# Patient Record
Sex: MALE | Race: WHITE | NOT HISPANIC OR LATINO | URBAN - METROPOLITAN AREA
[De-identification: names, ages, dates, MRNs, and addresses within clinical notes are randomized per-mention and may not be internally consistent; named-entity substitution may affect disease eponyms.]

---

## 2017-04-17 ENCOUNTER — EMERGENCY (EMERGENCY)
Facility: HOSPITAL | Age: 57
LOS: 0 days | Discharge: LEFT BEFORE DISCHARGE | End: 2017-04-17
Admitting: FAMILY MEDICINE

## 2017-06-27 DIAGNOSIS — E78.00 PURE HYPERCHOLESTEROLEMIA, UNSPECIFIED: ICD-10-CM

## 2017-06-27 DIAGNOSIS — Z79.02 LONG TERM (CURRENT) USE OF ANTITHROMBOTICS/ANTIPLATELETS: ICD-10-CM

## 2017-06-27 DIAGNOSIS — R05 COUGH: ICD-10-CM

## 2017-06-27 DIAGNOSIS — K21.9 GASTRO-ESOPHAGEAL REFLUX DISEASE WITHOUT ESOPHAGITIS: ICD-10-CM

## 2017-06-27 DIAGNOSIS — Z87.891 PERSONAL HISTORY OF NICOTINE DEPENDENCE: ICD-10-CM

## 2017-06-27 DIAGNOSIS — I10 ESSENTIAL (PRIMARY) HYPERTENSION: ICD-10-CM

## 2017-06-27 DIAGNOSIS — R07.89 OTHER CHEST PAIN: ICD-10-CM

## 2017-06-27 DIAGNOSIS — Z79.899 OTHER LONG TERM (CURRENT) DRUG THERAPY: ICD-10-CM

## 2017-06-27 DIAGNOSIS — R06.02 SHORTNESS OF BREATH: ICD-10-CM

## 2017-06-27 DIAGNOSIS — Z79.82 LONG TERM (CURRENT) USE OF ASPIRIN: ICD-10-CM

## 2017-06-27 DIAGNOSIS — I25.2 OLD MYOCARDIAL INFARCTION: ICD-10-CM

## 2017-06-27 DIAGNOSIS — Z95.5 PRESENCE OF CORONARY ANGIOPLASTY IMPLANT AND GRAFT: ICD-10-CM

## 2017-07-18 ENCOUNTER — OUTPATIENT (OUTPATIENT)
Dept: OUTPATIENT SERVICES | Facility: HOSPITAL | Age: 57
LOS: 1 days | Discharge: HOME | End: 2017-07-18

## 2017-07-18 DIAGNOSIS — I25.10 ATHEROSCLEROTIC HEART DISEASE OF NATIVE CORONARY ARTERY WITHOUT ANGINA PECTORIS: ICD-10-CM

## 2017-07-18 DIAGNOSIS — R07.9 CHEST PAIN, UNSPECIFIED: ICD-10-CM

## 2017-07-18 DIAGNOSIS — N40.0 BENIGN PROSTATIC HYPERPLASIA WITHOUT LOWER URINARY TRACT SYMPTOMS: ICD-10-CM

## 2017-07-18 DIAGNOSIS — J18.9 PNEUMONIA, UNSPECIFIED ORGANISM: ICD-10-CM

## 2017-07-18 DIAGNOSIS — I10 ESSENTIAL (PRIMARY) HYPERTENSION: ICD-10-CM

## 2017-07-18 DIAGNOSIS — R78.81 BACTEREMIA: ICD-10-CM

## 2017-07-18 DIAGNOSIS — K29.70 GASTRITIS, UNSPECIFIED, WITHOUT BLEEDING: ICD-10-CM

## 2017-07-18 DIAGNOSIS — F93.0 SEPARATION ANXIETY DISORDER OF CHILDHOOD: ICD-10-CM

## 2017-07-18 DIAGNOSIS — Z02.83 ENCOUNTER FOR BLOOD-ALCOHOL AND BLOOD-DRUG TEST: ICD-10-CM

## 2017-07-24 ENCOUNTER — OUTPATIENT (OUTPATIENT)
Dept: OUTPATIENT SERVICES | Facility: HOSPITAL | Age: 57
LOS: 1 days | Discharge: HOME | End: 2017-07-24

## 2017-07-24 DIAGNOSIS — I25.10 ATHEROSCLEROTIC HEART DISEASE OF NATIVE CORONARY ARTERY WITHOUT ANGINA PECTORIS: ICD-10-CM

## 2017-07-24 DIAGNOSIS — R52 PAIN, UNSPECIFIED: ICD-10-CM

## 2017-07-24 DIAGNOSIS — R07.9 CHEST PAIN, UNSPECIFIED: ICD-10-CM

## 2017-07-24 DIAGNOSIS — F93.0 SEPARATION ANXIETY DISORDER OF CHILDHOOD: ICD-10-CM

## 2017-07-24 DIAGNOSIS — I10 ESSENTIAL (PRIMARY) HYPERTENSION: ICD-10-CM

## 2017-07-24 DIAGNOSIS — K29.70 GASTRITIS, UNSPECIFIED, WITHOUT BLEEDING: ICD-10-CM

## 2017-07-24 DIAGNOSIS — N40.0 BENIGN PROSTATIC HYPERPLASIA WITHOUT LOWER URINARY TRACT SYMPTOMS: ICD-10-CM

## 2017-07-24 DIAGNOSIS — J18.9 PNEUMONIA, UNSPECIFIED ORGANISM: ICD-10-CM

## 2017-07-24 DIAGNOSIS — R78.81 BACTEREMIA: ICD-10-CM

## 2017-09-20 PROBLEM — Z00.00 ENCOUNTER FOR PREVENTIVE HEALTH EXAMINATION: Status: ACTIVE | Noted: 2017-09-20

## 2017-10-20 ENCOUNTER — APPOINTMENT (OUTPATIENT)
Dept: VASCULAR SURGERY | Facility: CLINIC | Age: 57
End: 2017-10-20
Payer: MEDICAID

## 2017-10-20 VITALS
WEIGHT: 176 LBS | DIASTOLIC BLOOD PRESSURE: 68 MMHG | SYSTOLIC BLOOD PRESSURE: 126 MMHG | HEIGHT: 71 IN | BODY MASS INDEX: 24.64 KG/M2

## 2017-10-20 DIAGNOSIS — Z98.61 CORONARY ANGIOPLASTY STATUS: ICD-10-CM

## 2017-10-20 DIAGNOSIS — Z87.09 PERSONAL HISTORY OF OTHER DISEASES OF THE RESPIRATORY SYSTEM: ICD-10-CM

## 2017-10-20 DIAGNOSIS — Z87.891 PERSONAL HISTORY OF NICOTINE DEPENDENCE: ICD-10-CM

## 2017-10-20 DIAGNOSIS — Z86.59 PERSONAL HISTORY OF OTHER MENTAL AND BEHAVIORAL DISORDERS: ICD-10-CM

## 2017-10-20 DIAGNOSIS — I71.2 THORACIC AORTIC ANEURYSM, W/OUT RUPTURE: ICD-10-CM

## 2017-10-20 DIAGNOSIS — I25.2 OLD MYOCARDIAL INFARCTION: ICD-10-CM

## 2017-10-20 DIAGNOSIS — Z78.9 OTHER SPECIFIED HEALTH STATUS: ICD-10-CM

## 2017-10-20 DIAGNOSIS — Z86.39 PERSONAL HISTORY OF OTHER ENDOCRINE, NUTRITIONAL AND METABOLIC DISEASE: ICD-10-CM

## 2017-10-20 DIAGNOSIS — Z87.19 PERSONAL HISTORY OF OTHER DISEASES OF THE DIGESTIVE SYSTEM: ICD-10-CM

## 2017-10-20 DIAGNOSIS — F41.9 ANXIETY DISORDER, UNSPECIFIED: ICD-10-CM

## 2017-10-20 PROCEDURE — 93978 VASCULAR STUDY: CPT

## 2017-10-20 PROCEDURE — 93925 LOWER EXTREMITY STUDY: CPT

## 2017-10-20 PROCEDURE — 99203 OFFICE O/P NEW LOW 30 MIN: CPT

## 2017-11-22 ENCOUNTER — OUTPATIENT (OUTPATIENT)
Dept: OUTPATIENT SERVICES | Facility: HOSPITAL | Age: 57
LOS: 1 days | Discharge: HOME | End: 2017-11-22

## 2017-11-22 DIAGNOSIS — N40.0 BENIGN PROSTATIC HYPERPLASIA WITHOUT LOWER URINARY TRACT SYMPTOMS: ICD-10-CM

## 2017-11-22 DIAGNOSIS — R07.9 CHEST PAIN, UNSPECIFIED: ICD-10-CM

## 2017-11-22 DIAGNOSIS — R78.81 BACTEREMIA: ICD-10-CM

## 2017-11-22 DIAGNOSIS — J18.9 PNEUMONIA, UNSPECIFIED ORGANISM: ICD-10-CM

## 2017-11-22 DIAGNOSIS — I73.9 PERIPHERAL VASCULAR DISEASE, UNSPECIFIED: ICD-10-CM

## 2017-11-22 DIAGNOSIS — I10 ESSENTIAL (PRIMARY) HYPERTENSION: ICD-10-CM

## 2017-11-22 DIAGNOSIS — I25.10 ATHEROSCLEROTIC HEART DISEASE OF NATIVE CORONARY ARTERY WITHOUT ANGINA PECTORIS: ICD-10-CM

## 2017-11-22 DIAGNOSIS — K29.70 GASTRITIS, UNSPECIFIED, WITHOUT BLEEDING: ICD-10-CM

## 2017-11-22 DIAGNOSIS — F93.0 SEPARATION ANXIETY DISORDER OF CHILDHOOD: ICD-10-CM

## 2017-12-22 ENCOUNTER — APPOINTMENT (OUTPATIENT)
Dept: VASCULAR SURGERY | Facility: CLINIC | Age: 57
End: 2017-12-22
Payer: MEDICAID

## 2017-12-22 PROCEDURE — 99213 OFFICE O/P EST LOW 20 MIN: CPT

## 2017-12-22 RX ORDER — ASPIRIN 81 MG
81 TABLET, DELAYED RELEASE (ENTERIC COATED) ORAL
Refills: 0 | Status: ACTIVE | COMMUNITY

## 2017-12-22 RX ORDER — MELATONIN 3 MG
TABLET ORAL
Refills: 0 | Status: ACTIVE | COMMUNITY

## 2017-12-22 RX ORDER — METOPROLOL SUCCINATE 200 MG/1
TABLET, EXTENDED RELEASE ORAL
Refills: 0 | Status: ACTIVE | COMMUNITY

## 2017-12-22 RX ORDER — ALPRAZOLAM 2 MG/1
TABLET ORAL
Refills: 0 | Status: ACTIVE | COMMUNITY

## 2017-12-22 RX ORDER — ALPRAZOLAM 2 MG/1
2 TABLET ORAL
Refills: 0 | Status: ACTIVE | COMMUNITY

## 2017-12-22 RX ORDER — NITROGLYCERIN 0.4 MG/1
0.4 TABLET SUBLINGUAL
Refills: 0 | Status: ACTIVE | COMMUNITY

## 2017-12-22 RX ORDER — RANITIDINE HYDROCHLORIDE 300 MG/1
300 TABLET, FILM COATED ORAL
Refills: 0 | Status: ACTIVE | COMMUNITY

## 2017-12-22 RX ORDER — CLOPIDOGREL BISULFATE 75 MG/1
75 TABLET, FILM COATED ORAL
Refills: 0 | Status: ACTIVE | COMMUNITY

## 2017-12-22 RX ORDER — ALUMINUM HYDROXIDE, MAGNESIUM HYDROXIDE, SIMETHICONE 400; 400; 40 MG/10ML; MG/10ML; MG/10ML
200-200-20 SUSPENSION ORAL
Refills: 0 | Status: ACTIVE | COMMUNITY

## 2017-12-22 RX ORDER — ROSUVASTATIN CALCIUM 40 MG/1
40 TABLET, FILM COATED ORAL
Refills: 0 | Status: ACTIVE | COMMUNITY

## 2017-12-28 ENCOUNTER — OUTPATIENT (OUTPATIENT)
Dept: OUTPATIENT SERVICES | Facility: HOSPITAL | Age: 57
LOS: 1 days | Discharge: HOME | End: 2017-12-28

## 2017-12-28 DIAGNOSIS — F93.0 SEPARATION ANXIETY DISORDER OF CHILDHOOD: ICD-10-CM

## 2017-12-28 DIAGNOSIS — K29.70 GASTRITIS, UNSPECIFIED, WITHOUT BLEEDING: ICD-10-CM

## 2017-12-28 DIAGNOSIS — Z01.818 ENCOUNTER FOR OTHER PREPROCEDURAL EXAMINATION: ICD-10-CM

## 2017-12-28 DIAGNOSIS — R78.81 BACTEREMIA: ICD-10-CM

## 2017-12-28 DIAGNOSIS — I25.10 ATHEROSCLEROTIC HEART DISEASE OF NATIVE CORONARY ARTERY WITHOUT ANGINA PECTORIS: ICD-10-CM

## 2017-12-28 DIAGNOSIS — I10 ESSENTIAL (PRIMARY) HYPERTENSION: ICD-10-CM

## 2017-12-28 DIAGNOSIS — J18.9 PNEUMONIA, UNSPECIFIED ORGANISM: ICD-10-CM

## 2017-12-28 DIAGNOSIS — R07.9 CHEST PAIN, UNSPECIFIED: ICD-10-CM

## 2017-12-28 DIAGNOSIS — N40.0 BENIGN PROSTATIC HYPERPLASIA WITHOUT LOWER URINARY TRACT SYMPTOMS: ICD-10-CM

## 2018-01-17 ENCOUNTER — OUTPATIENT (OUTPATIENT)
Dept: OUTPATIENT SERVICES | Facility: HOSPITAL | Age: 58
LOS: 1 days | Discharge: HOME | End: 2018-01-17

## 2018-01-17 ENCOUNTER — APPOINTMENT (OUTPATIENT)
Dept: VASCULAR SURGERY | Facility: HOSPITAL | Age: 58
End: 2018-01-17
Payer: MEDICAID

## 2018-01-17 DIAGNOSIS — I25.10 ATHEROSCLEROTIC HEART DISEASE OF NATIVE CORONARY ARTERY WITHOUT ANGINA PECTORIS: ICD-10-CM

## 2018-01-17 DIAGNOSIS — N40.0 BENIGN PROSTATIC HYPERPLASIA WITHOUT LOWER URINARY TRACT SYMPTOMS: ICD-10-CM

## 2018-01-17 DIAGNOSIS — R07.9 CHEST PAIN, UNSPECIFIED: ICD-10-CM

## 2018-01-17 DIAGNOSIS — F93.0 SEPARATION ANXIETY DISORDER OF CHILDHOOD: ICD-10-CM

## 2018-01-17 DIAGNOSIS — I10 ESSENTIAL (PRIMARY) HYPERTENSION: ICD-10-CM

## 2018-01-17 DIAGNOSIS — J18.9 PNEUMONIA, UNSPECIFIED ORGANISM: ICD-10-CM

## 2018-01-17 DIAGNOSIS — R78.81 BACTEREMIA: ICD-10-CM

## 2018-01-17 DIAGNOSIS — K29.70 GASTRITIS, UNSPECIFIED, WITHOUT BLEEDING: ICD-10-CM

## 2018-01-17 PROCEDURE — 37221: CPT | Mod: 50

## 2018-01-17 PROCEDURE — 37223: CPT

## 2018-01-17 PROCEDURE — 76937 US GUIDE VASCULAR ACCESS: CPT | Mod: 26

## 2018-01-17 PROCEDURE — 75630 X-RAY AORTA LEG ARTERIES: CPT | Mod: 26,59

## 2018-01-17 PROCEDURE — 36200 PLACE CATHETER IN AORTA: CPT | Mod: 59

## 2018-01-17 PROCEDURE — 36245 INS CATH ABD/L-EXT ART 1ST: CPT | Mod: 59

## 2018-01-17 PROCEDURE — 75716 ARTERY X-RAYS ARMS/LEGS: CPT | Mod: 26,59

## 2018-01-22 DIAGNOSIS — J44.9 CHRONIC OBSTRUCTIVE PULMONARY DISEASE, UNSPECIFIED: ICD-10-CM

## 2018-01-22 DIAGNOSIS — Z87.891 PERSONAL HISTORY OF NICOTINE DEPENDENCE: ICD-10-CM

## 2018-01-22 DIAGNOSIS — I70.213 ATHEROSCLEROSIS OF NATIVE ARTERIES OF EXTREMITIES WITH INTERMITTENT CLAUDICATION, BILATERAL LEGS: ICD-10-CM

## 2018-01-22 DIAGNOSIS — I10 ESSENTIAL (PRIMARY) HYPERTENSION: ICD-10-CM

## 2018-01-30 ENCOUNTER — APPOINTMENT (OUTPATIENT)
Dept: VASCULAR SURGERY | Facility: CLINIC | Age: 58
End: 2018-01-30
Payer: MEDICAID

## 2018-01-30 PROCEDURE — 93978 VASCULAR STUDY: CPT

## 2018-01-30 PROCEDURE — 99213 OFFICE O/P EST LOW 20 MIN: CPT

## 2018-02-10 ENCOUNTER — EMERGENCY (EMERGENCY)
Facility: HOSPITAL | Age: 58
LOS: 0 days | Discharge: HOME | End: 2018-02-10
Attending: EMERGENCY MEDICINE

## 2018-02-10 VITALS
RESPIRATION RATE: 18 BRPM | TEMPERATURE: 98 F | HEART RATE: 95 BPM | OXYGEN SATURATION: 98 % | SYSTOLIC BLOOD PRESSURE: 157 MMHG | DIASTOLIC BLOOD PRESSURE: 84 MMHG

## 2018-02-10 VITALS
RESPIRATION RATE: 18 BRPM | OXYGEN SATURATION: 99 % | DIASTOLIC BLOOD PRESSURE: 64 MMHG | SYSTOLIC BLOOD PRESSURE: 121 MMHG | TEMPERATURE: 99 F | HEART RATE: 68 BPM

## 2018-02-10 DIAGNOSIS — E78.00 PURE HYPERCHOLESTEROLEMIA, UNSPECIFIED: ICD-10-CM

## 2018-02-10 DIAGNOSIS — I25.10 ATHEROSCLEROTIC HEART DISEASE OF NATIVE CORONARY ARTERY WITHOUT ANGINA PECTORIS: ICD-10-CM

## 2018-02-10 DIAGNOSIS — F17.210 NICOTINE DEPENDENCE, CIGARETTES, UNCOMPLICATED: ICD-10-CM
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
## 2018-02-10 DIAGNOSIS — R51 HEADACHE: ICD-10-CM

## 2018-02-10 DIAGNOSIS — Z79.02 LONG TERM (CURRENT) USE OF ANTITHROMBOTICS/ANTIPLATELETS: ICD-10-CM

## 2018-02-10 DIAGNOSIS — J32.9 CHRONIC SINUSITIS, UNSPECIFIED: ICD-10-CM
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
## 2018-02-10 DIAGNOSIS — I10 ESSENTIAL (PRIMARY) HYPERTENSION: ICD-10-CM
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
## 2018-02-10 DIAGNOSIS — I25.2 OLD MYOCARDIAL INFARCTION: ICD-10-CM

## 2018-02-10 DIAGNOSIS — Z98.890 OTHER SPECIFIED POSTPROCEDURAL STATES: ICD-10-CM

## 2018-02-10 LAB
ALBUMIN SERPL ELPH-MCNC: 4.2 G/DL — SIGNIFICANT CHANGE UP (ref 3–5.5)
ALP SERPL-CCNC: 151 U/L — HIGH (ref 30–115)
ALT FLD-CCNC: 81 U/L — HIGH (ref 0–41)
ANION GAP SERPL CALC-SCNC: 10 MMOL/L — SIGNIFICANT CHANGE UP (ref 7–14)
APTT BLD: 26.9 SEC — LOW (ref 27–39.2)
AST SERPL-CCNC: 49 U/L — HIGH (ref 0–41)
BASOPHILS # BLD AUTO: 0.01 K/UL — SIGNIFICANT CHANGE UP (ref 0–0.2)
BASOPHILS NFR BLD AUTO: 0.1 % — SIGNIFICANT CHANGE UP (ref 0–1)
BILIRUB SERPL-MCNC: 0.9 MG/DL — SIGNIFICANT CHANGE UP (ref 0.2–1.2)
BUN SERPL-MCNC: 12 MG/DL — SIGNIFICANT CHANGE UP (ref 10–20)
CALCIUM SERPL-MCNC: 9.9 MG/DL — SIGNIFICANT CHANGE UP (ref 8.5–10.1)
CHLORIDE SERPL-SCNC: 104 MMOL/L — SIGNIFICANT CHANGE UP (ref 98–110)
CO2 SERPL-SCNC: 25 MMOL/L — SIGNIFICANT CHANGE UP (ref 17–32)
CREAT SERPL-MCNC: 0.8 MG/DL — SIGNIFICANT CHANGE UP (ref 0.7–1.5)
EOSINOPHIL # BLD AUTO: 0 K/UL — SIGNIFICANT CHANGE UP (ref 0–0.7)
EOSINOPHIL NFR BLD AUTO: 0 % — SIGNIFICANT CHANGE UP (ref 0–8)
GLUCOSE SERPL-MCNC: 133 MG/DL — HIGH (ref 70–110)
IMM GRANULOCYTES NFR BLD AUTO: 0.3 % — SIGNIFICANT CHANGE UP (ref 0.1–0.3)
INR BLD: 1.08 RATIO — SIGNIFICANT CHANGE UP (ref 0.65–1.3)
LYMPHOCYTES # BLD AUTO: 1.38 K/UL — SIGNIFICANT CHANGE UP (ref 1.2–3.4)
LYMPHOCYTES # BLD AUTO: 14.9 % — LOW (ref 20.5–51.1)
MONOCYTES # BLD AUTO: 0.4 K/UL — SIGNIFICANT CHANGE UP (ref 0.1–0.6)
MONOCYTES NFR BLD AUTO: 4.3 % — SIGNIFICANT CHANGE UP (ref 1.7–9.3)
NEUTROPHILS # BLD AUTO: 7.44 K/UL — HIGH (ref 1.4–6.5)
NEUTROPHILS NFR BLD AUTO: 80.4 % — HIGH (ref 42.2–75.2)
NRBC # BLD: 0 /100 WBCS — SIGNIFICANT CHANGE UP (ref 0–0)
POTASSIUM SERPL-MCNC: 4.5 MMOL/L — SIGNIFICANT CHANGE UP (ref 3.5–5)
POTASSIUM SERPL-SCNC: 4.5 MMOL/L — SIGNIFICANT CHANGE UP (ref 3.5–5)
PROT SERPL-MCNC: 7.6 G/DL — SIGNIFICANT CHANGE UP (ref 6–8)
PROTHROM AB SERPL-ACNC: 11.7 SEC — SIGNIFICANT CHANGE UP (ref 9.95–12.87)
SODIUM SERPL-SCNC: 139 MMOL/L — SIGNIFICANT CHANGE UP (ref 135–146)

## 2018-02-10 RX ORDER — ROSUVASTATIN CALCIUM 5 MG/1
1 TABLET ORAL
Qty: 0 | Refills: 0 | COMMUNITY

## 2018-02-10 RX ORDER — ASPIRIN/CALCIUM CARB/MAGNESIUM 324 MG
1 TABLET ORAL
Qty: 0 | Refills: 0 | COMMUNITY

## 2018-02-10 RX ORDER — ALPRAZOLAM 0.25 MG
1 TABLET ORAL
Qty: 0 | Refills: 0 | COMMUNITY

## 2018-02-10 RX ORDER — MORPHINE SULFATE 50 MG/1
2 CAPSULE, EXTENDED RELEASE ORAL ONCE
Qty: 0 | Refills: 0 | Status: DISCONTINUED | OUTPATIENT
Start: 2018-02-10 | End: 2018-02-10

## 2018-02-10 RX ORDER — SODIUM CHLORIDE 9 MG/ML
1000 INJECTION INTRAMUSCULAR; INTRAVENOUS; SUBCUTANEOUS ONCE
Qty: 0 | Refills: 0 | Status: COMPLETED | OUTPATIENT
Start: 2018-02-10 | End: 2018-02-10

## 2018-02-10 RX ORDER — METOCLOPRAMIDE HCL 10 MG
10 TABLET ORAL ONCE
Qty: 0 | Refills: 0 | Status: COMPLETED | OUTPATIENT
Start: 2018-02-10 | End: 2018-02-10

## 2018-02-10 RX ORDER — METOPROLOL TARTRATE 50 MG
1 TABLET ORAL
Qty: 0 | Refills: 0 | COMMUNITY

## 2018-02-10 RX ORDER — CLOPIDOGREL BISULFATE 75 MG/1
1 TABLET, FILM COATED ORAL
Qty: 0 | Refills: 0 | COMMUNITY

## 2018-02-10 RX ORDER — ALBUTEROL 90 UG/1
2 AEROSOL, METERED ORAL
Qty: 0 | Refills: 0 | COMMUNITY

## 2018-02-10 RX ADMIN — MORPHINE SULFATE 2 MILLIGRAM(S): 50 CAPSULE, EXTENDED RELEASE ORAL at 21:21

## 2018-02-10 RX ADMIN — MORPHINE SULFATE 2 MILLIGRAM(S): 50 CAPSULE, EXTENDED RELEASE ORAL at 19:56

## 2018-02-10 RX ADMIN — SODIUM CHLORIDE 1000 MILLILITER(S): 9 INJECTION INTRAMUSCULAR; INTRAVENOUS; SUBCUTANEOUS at 19:56

## 2018-02-10 RX ADMIN — Medication 104 MILLIGRAM(S): at 19:56

## 2018-02-10 NOTE — ED PROVIDER NOTE - PHYSICAL EXAMINATION
VITAL SIGNS: I have reviewed nursing notes and confirm.  CONSTITUTIONAL: Well-developed; well-nourished; in no acute distress. pt comfortable.  SKIN: skin exam is warm and dry, no acute rash.   HEAD: Normocephalic; atraumatic.  EYES:  PERRLA EOM intact; conjunctiva and sclera clear.  ENT: No nasal discharge; airway clear. moist oral mucosa; uvula at midline. no pharyngeal erythema, edema exudate or vesicles.  TMs with good light reflex b/l.    NECK: Supple; non tender.  CARD: S1, S2 normal; no murmurs, gallops, or rubs. Regular rate and rhythm. posterior tibial and radial pulses 2+  RESP: No wheezes, rales or rhonchi. cta b/l. no use of accessory muscles. no retractions  ABD: Normal bowel sounds; soft; non-distended; non-tender; no rebound.   EXT: Normal ROM. No  cyanosis or edema.  BACK: No cva tenderness  LYMPH: No acute cervical adenopathy.  NEURO: Alert, oriented, grossly unremarkable.  CN 2-12 intact. normal gait. normal romberg's.  sensory grossly intact to face, upper and lower extremity.  5/5 strength to , extension and flexion at elbow, flexion at hip, extension and flexion at knees. finger to nose exam normal.  PSYCH: Cooperative, appropriate.

## 2018-02-10 NOTE — ED PROVIDER NOTE - OBJECTIVE STATEMENT
57y/o M w/ CAD (2 MIs), thoracic aortic aneurysm being monitored, HTN, cholesterol, prostate surgery.  recent L and R. iliac stents placed presents for 6 days of headache to forehead and top of forehead-  constant;changes in intensity; worse at night; feels like pounding and tihgt.  +nausea. no neck stiffness, fevers, vomiting, changes to visions.  Pt went to urgent care last night advised him to come to ER because of headache and lymphadenopathy.    Denies cough, congestion, runny nose.    Takes motrin and tylenol-- no improvement.

## 2018-02-10 NOTE — ED PROVIDER NOTE - MEDICAL DECISION MAKING DETAILS
chart complete. Pt given follow up instructions and return instructions. Felt improved upon discharge
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
## 2018-02-10 NOTE — ED PROVIDER NOTE - NS ED ROS FT
ROS: No fever/chills, No photophobia/eye pain/changes in vision, No ear pain/sore throat/dysphagia, No chest pain/palpitations, no SOB/cough/wheeze/stridor, No abdominal pain, No V/D/melena, no dysuria/frequency/discharge, No neck/back pain, no rash, no changes in neurological status/function.    +headache see hpi
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
## 2018-02-11 LAB
HCT VFR BLD CALC: 42.7 % — SIGNIFICANT CHANGE UP (ref 42–52)
HGB BLD-MCNC: 12.7 G/DL — LOW (ref 14–18)
MCHC RBC-ENTMCNC: 18.3 PG — LOW (ref 27–31)
MCHC RBC-ENTMCNC: 29.7 G/DL — LOW (ref 32–37)
MCV RBC AUTO: 61.6 FL — LOW (ref 80–94)
PLATELET # BLD AUTO: 218 K/UL — SIGNIFICANT CHANGE UP (ref 130–400)
RBC # BLD: 6.93 M/UL — HIGH (ref 4.7–6.1)
RBC # FLD: 19.7 % — HIGH (ref 11.5–14.5)
WBC # BLD: 9.26 K/UL — SIGNIFICANT CHANGE UP (ref 4.8–10.8)
WBC # FLD AUTO: 9.26 K/UL — SIGNIFICANT CHANGE UP (ref 4.8–10.8)

## 2018-02-20 ENCOUNTER — APPOINTMENT (OUTPATIENT)
Dept: VASCULAR SURGERY | Facility: CLINIC | Age: 58
End: 2018-02-20
Payer: MEDICAID

## 2018-02-20 VITALS — WEIGHT: 175 LBS | BODY MASS INDEX: 24.5 KG/M2 | HEIGHT: 71 IN

## 2018-02-20 PROCEDURE — 99212 OFFICE O/P EST SF 10 MIN: CPT

## 2018-05-16 PROBLEM — I73.9 PVD (PERIPHERAL VASCULAR DISEASE): Status: ACTIVE | Noted: 2017-10-20

## 2018-05-16 PROBLEM — I77.1 ILIAC ARTERY STENOSIS, BILATERAL: Status: ACTIVE | Noted: 2017-10-20

## 2018-05-22 ENCOUNTER — APPOINTMENT (OUTPATIENT)
Dept: VASCULAR SURGERY | Facility: CLINIC | Age: 58
End: 2018-05-22

## 2018-05-22 DIAGNOSIS — I77.1 STRICTURE OF ARTERY: ICD-10-CM

## 2018-05-22 DIAGNOSIS — I73.9 PERIPHERAL VASCULAR DISEASE, UNSPECIFIED: ICD-10-CM

## 2018-10-19 ENCOUNTER — APPOINTMENT (OUTPATIENT)
Dept: VASCULAR SURGERY | Facility: CLINIC | Age: 58
End: 2018-10-19

## 2019-01-15 ENCOUNTER — EMERGENCY (EMERGENCY)
Facility: HOSPITAL | Age: 59
LOS: 0 days | Discharge: HOME | End: 2019-01-15
Attending: EMERGENCY MEDICINE | Admitting: EMERGENCY MEDICINE

## 2019-01-15 VITALS
DIASTOLIC BLOOD PRESSURE: 97 MMHG | HEART RATE: 96 BPM | OXYGEN SATURATION: 100 % | TEMPERATURE: 97 F | SYSTOLIC BLOOD PRESSURE: 171 MMHG | RESPIRATION RATE: 16 BRPM

## 2019-01-15 DIAGNOSIS — Z79.02 LONG TERM (CURRENT) USE OF ANTITHROMBOTICS/ANTIPLATELETS: ICD-10-CM

## 2019-01-15 DIAGNOSIS — I25.10 ATHEROSCLEROTIC HEART DISEASE OF NATIVE CORONARY ARTERY WITHOUT ANGINA PECTORIS: ICD-10-CM

## 2019-01-15 DIAGNOSIS — Z79.899 OTHER LONG TERM (CURRENT) DRUG THERAPY: ICD-10-CM

## 2019-01-15 DIAGNOSIS — J32.9 CHRONIC SINUSITIS, UNSPECIFIED: ICD-10-CM

## 2019-01-15 DIAGNOSIS — M54.2 CERVICALGIA: ICD-10-CM

## 2019-01-15 DIAGNOSIS — I10 ESSENTIAL (PRIMARY) HYPERTENSION: ICD-10-CM

## 2019-01-15 DIAGNOSIS — Z79.82 LONG TERM (CURRENT) USE OF ASPIRIN: ICD-10-CM

## 2019-01-15 DIAGNOSIS — Z79.51 LONG TERM (CURRENT) USE OF INHALED STEROIDS: ICD-10-CM

## 2019-01-15 DIAGNOSIS — Z79.2 LONG TERM (CURRENT) USE OF ANTIBIOTICS: ICD-10-CM

## 2019-01-15 DIAGNOSIS — R51 HEADACHE: ICD-10-CM

## 2019-01-15 DIAGNOSIS — E78.00 PURE HYPERCHOLESTEROLEMIA, UNSPECIFIED: ICD-10-CM

## 2019-01-15 RX ORDER — OXYCODONE AND ACETAMINOPHEN 5; 325 MG/1; MG/1
1 TABLET ORAL ONCE
Qty: 0 | Refills: 0 | Status: DISCONTINUED | OUTPATIENT
Start: 2019-01-15 | End: 2019-01-15

## 2019-01-15 RX ORDER — KETOROLAC TROMETHAMINE 30 MG/ML
30 SYRINGE (ML) INJECTION ONCE
Qty: 0 | Refills: 0 | Status: DISCONTINUED | OUTPATIENT
Start: 2019-01-15 | End: 2019-01-15

## 2019-01-15 RX ORDER — METHOCARBAMOL 500 MG/1
1 TABLET, FILM COATED ORAL
Qty: 15 | Refills: 0 | OUTPATIENT
Start: 2019-01-15 | End: 2019-01-19

## 2019-01-15 RX ORDER — METOCLOPRAMIDE HCL 10 MG
10 TABLET ORAL ONCE
Qty: 0 | Refills: 0 | Status: COMPLETED | OUTPATIENT
Start: 2019-01-15 | End: 2019-01-15

## 2019-01-15 RX ORDER — DEXAMETHASONE 0.5 MG/5ML
10 ELIXIR ORAL ONCE
Qty: 0 | Refills: 0 | Status: COMPLETED | OUTPATIENT
Start: 2019-01-15 | End: 2019-01-15

## 2019-01-15 RX ORDER — METHOCARBAMOL 500 MG/1
750 TABLET, FILM COATED ORAL ONCE
Qty: 0 | Refills: 0 | Status: COMPLETED | OUTPATIENT
Start: 2019-01-15 | End: 2019-01-15

## 2019-01-15 RX ORDER — ACETAMINOPHEN 500 MG
2 TABLET ORAL
Qty: 40 | Refills: 0 | OUTPATIENT
Start: 2019-01-15 | End: 2019-01-19

## 2019-01-15 RX ORDER — SODIUM CHLORIDE 9 MG/ML
1000 INJECTION INTRAMUSCULAR; INTRAVENOUS; SUBCUTANEOUS ONCE
Qty: 0 | Refills: 0 | Status: COMPLETED | OUTPATIENT
Start: 2019-01-15 | End: 2019-01-15

## 2019-01-15 RX ADMIN — METHOCARBAMOL 750 MILLIGRAM(S): 500 TABLET, FILM COATED ORAL at 15:16

## 2019-01-15 RX ADMIN — Medication 10 MILLIGRAM(S): at 15:16

## 2019-01-15 RX ADMIN — Medication 30 MILLIGRAM(S): at 18:37

## 2019-01-15 RX ADMIN — Medication 1 TABLET(S): at 18:37

## 2019-01-15 RX ADMIN — Medication 102 MILLIGRAM(S): at 18:37

## 2019-01-15 RX ADMIN — SODIUM CHLORIDE 2400 MILLILITER(S): 9 INJECTION INTRAMUSCULAR; INTRAVENOUS; SUBCUTANEOUS at 15:16

## 2019-01-15 RX ADMIN — OXYCODONE AND ACETAMINOPHEN 1 TABLET(S): 5; 325 TABLET ORAL at 16:40

## 2019-01-15 NOTE — ED PROVIDER NOTE - MEDICAL DECISION MAKING DETAILS
pt with normal neuro exam, ct showing sinusitis. pt treated with anti-inflamatory, neck remains supple outpt f/u advised

## 2019-01-15 NOTE — ED PROVIDER NOTE - NS ED ROS FT
Review of Systems    Constitutional: (-) fever  Cardiovascular: (-) chest pain, (-) syncope  Respiratory: (-) cough, (-) shortness of breath  Gastrointestinal: (-) vomiting, (-) diarrhea, (-) abdominal pain  Musculoskeletal: (+) neck pain, (-) back pain, (-) joint pain  Integumentary: (-) rash, (-) edema  Neurological: (+) headache, (-) altered mental status    Except as documented in the HPI, all other systems are negative.

## 2019-01-15 NOTE — ED PROVIDER NOTE - NSFOLLOWUPCLINICS_GEN_ALL_ED_FT
[facility]  ENT  [address], [address]  [place] [address]  Phone: [phone]  Fax:   Follow Up Time: 4-6 Days

## 2019-01-15 NOTE — ED PROVIDER NOTE - OBJECTIVE STATEMENT
58 y/o M with PMHx HTN, CAD, and Dyslipidemia on Plavix here for R sided HA x2 months. Pt is c/o pain to R posterior head that radiates upward to back of head also associated with R sided neck pain, worse with movement. Pt tried Excedrin with no improvement. [place] [facility] HA is not associated with any weakness/numbness or hearing changes. No discharge from ears. Pt denies f/c.

## 2019-01-15 NOTE — ED PROVIDER NOTE - CARE PROVIDER_API CALL
Curtis Peterson (MD), Neurological Surgery  [address] [place] [address]  Phone: [phone]  Fax: [phone]

## 2019-01-15 NOTE — ED PROVIDER NOTE - PHYSICAL EXAMINATION
awake alert, sitting up in bed smiling upon approach, neck supple, but pain to right neck worse with mvt, paracervical ttp perrla eomi, cn 2-12 intact, motor 5/5 X4, sensation grossly intact. normal finger to nose, normal rapid alt mvt, normal gait.  tmc clear bl, no mastoid ttp bl, CV: rrr, equal pulses b/l, RESP: cta b/l, GI:  soft, nontender, no rebound, no guarding, SKIN: no rash, MSK: no deformities  Psychiatric: appropriate mood, appropriate affect

## 2019-01-15 NOTE — ED PROVIDER NOTE - CARE PLAN
Principal Discharge DX:	Headache  Secondary Diagnosis:	Neck pain Principal Discharge DX:	Headache  Secondary Diagnosis:	Neck pain  Secondary Diagnosis:	Sinusitis

## 2019-01-24 ENCOUNTER — APPOINTMENT (OUTPATIENT)
Dept: NEUROSURGERY | Facility: CLINIC | Age: 59
End: 2019-01-24
Payer: MEDICAID

## 2019-01-24 VITALS — BODY MASS INDEX: 24.5 KG/M2 | WEIGHT: 175 LBS | HEIGHT: 71 IN

## 2019-01-24 DIAGNOSIS — Z86.39 PERSONAL HISTORY OF OTHER ENDOCRINE, NUTRITIONAL AND METABOLIC DISEASE: ICD-10-CM

## 2019-01-24 DIAGNOSIS — Z87.891 PERSONAL HISTORY OF NICOTINE DEPENDENCE: ICD-10-CM

## 2019-01-24 DIAGNOSIS — M79.10 MYALGIA, UNSPECIFIED SITE: ICD-10-CM

## 2019-01-24 PROCEDURE — 99204 OFFICE O/P NEW MOD 45 MIN: CPT

## 2019-01-24 RX ORDER — TIZANIDINE 4 MG/1
4 TABLET ORAL EVERY 8 HOURS
Qty: 50 | Refills: 0 | Status: ACTIVE | COMMUNITY
Start: 2019-01-24 | End: 1900-01-01

## 2019-01-24 NOTE — HISTORY OF PRESENT ILLNESS
[de-identified] : He presents today with a 4 month history of right sided neck pain which started without trauma. Recently, he presented to the ER due to the severity of his pain. He was given a Toradol injection, Robaxin, Percocet, and Decadron. He denies radiculopathy. He has some tingling in the left 2-4th digits which started after a recent left shoulder surgery on 10/1/18.  Prior to this surgery he was diagnosed with a left shoulder rotator cuff tear. During the repair anchors were placed. He has been undergoing PT for the shoulder.  He has TPI's performed by his pain specialist without improvement. On today's exam stretching the neck seems to make him feel better. He has been taking Robaxin as needed for spasms and Tylenol/Motrin as needed. He denies recent PT for the neck itself. He denies bowel / bladder dysfunction.

## 2019-01-24 NOTE — PHYSICAL EXAM
[General Appearance - Alert] : alert [General Appearance - In No Acute Distress] : in no acute distress [Abnormal Walk] : normal gait [Right Paraspinal ___ (level)] : ~Ulevel [unfilled] right paraspinal [Right Trapezius Muscle] : right trapezius muscle [Restricted] : was restricted [Romberg's Sign] : Romberg's sign was negtive [FreeTextEntry5] : He has decreased ROM of the left shoulder status post recent shoulder surgery. There is no myelopathy. Motor exam is 5/5.

## 2019-01-24 NOTE — DATA REVIEWED
[de-identified] : \par - CT scan of the cervical spine from 1/15/19 showed cervicals spondylosis most impressive at C5/6 and C6/7.

## 2019-01-24 NOTE — ASSESSMENT
[FreeTextEntry1] : We have had a long and thorough discussion regarding his current condition findings and treatment options. He is aware of his CT scan findings. We have reviewed the images together today also.I believe his symptoms are related to cervical strain in conjunction with the underlying cervical spondylosis. I would like an MRI of the cervical spine and Dynamic xrays of the cervical spine for further evaluation. In the interim he will start physical therapy for the neck and will restart therapy for his left shoulder. He will trial Celebrex as needed for pain and Zanaflex as needed for spasms. He will follow up with his pain specialist for additional medical management. I will see him back in 4-6 weeks for reassessment and to review the above mentioned testing. If his symptoms do not improve we may consider referring him for EJ's. He is agreeable with our plan of care.

## 2019-02-21 RX ORDER — CELECOXIB 200 MG/1
200 CAPSULE ORAL TWICE DAILY
Qty: 60 | Refills: 2 | Status: ACTIVE | COMMUNITY
Start: 2019-01-24 | End: 1900-01-01

## 2019-02-26 ENCOUNTER — APPOINTMENT (OUTPATIENT)
Dept: NEUROSURGERY | Facility: CLINIC | Age: 59
End: 2019-02-26
Payer: MEDICAID

## 2019-02-26 VITALS — HEIGHT: 71 IN | WEIGHT: 175 LBS | BODY MASS INDEX: 24.5 KG/M2

## 2019-02-26 PROCEDURE — 99213 OFFICE O/P EST LOW 20 MIN: CPT

## 2019-02-26 NOTE — HISTORY OF PRESENT ILLNESS
[FreeTextEntry1] : He presents today with a 5 month history of right sided neck pain which started without trauma. He denies radiculopathy. Recently, he presented to the ER due to the severity of his pain. He was given a Toradol injection, Robaxin, Percocet, and Decadron. He has some tingling in the left 2-4th digits which started after a recent left shoulder surgery on 10/1/18. Prior to this surgery he was diagnosed with a left shoulder rotator cuff tear. During the repair anchors were placed. He has been undergoing PT for the shoulder. He has TPI's performed by his pain specialist without improvement. On today's exam stretching the neck seems to make him feel better. He has been taking Robaxin as needed for spasms and Tylenol/Motrin as needed. He has trialed Tizandine and Celebex without improvement. Last visit PT was recommended however he did not proceed. He denies bowel / bladder dysfunction. \par \par Since his last visit he had an MRI of the cervical spine which showed cervical spondylosis worse at C5/6 and C6/7 with right greater then left foraminal stenosis. He also had dynamic xrays which were stable without instability.

## 2019-02-26 NOTE — ASSESSMENT
[FreeTextEntry1] : We have had a long and thorough discussion regarding his current condition findings and treatment options. He is aware of his CT, MRI, and Xray findings. I believe his symptoms are related to the underlying spondylosis at C5/6 and C6/7. He will trial EJ's with pain mangement. He does not wish to pursue PT at this time. If no improvement, then we can consider a C5/6 C6/7 anterior cervical discectomy and fusion. I will see him back once the EJ's are completed. He is agreeable. \par \par \par

## 2019-02-26 NOTE — DATA REVIEWED
[de-identified] : \par -  CT scan of the cervical spine from 1/15/19 showed cervicals spondylosis most impressive at C5/6 and C6/7. [de-identified] : - MRI of the cervical spine which showed cervical spondylosis worse at C5/6 and C6/7 with right greater then left foraminal stenosis. [de-identified] : \par - Dynamic xrays of the cervical spine were stable without instability.

## 2019-02-26 NOTE — PHYSICAL EXAM
[FreeTextEntry1] : Constitutional: alert and in no acute distress. \par Sensory exam:. Romberg's sign was negative. \par Coordination:. normal gait.  \par Spine: Tenderness: right paraspinal and right trapezius muscle. Flexion was restricted. Extension was restricted. Left lateral flexion was restricted. Right lateral flexion was restricted. Special Tests: He has decreased ROM of the left shoulder status post recent shoulder surgery. There is no myelopathy. Motor exam is 5/5.

## 2019-03-26 ENCOUNTER — APPOINTMENT (OUTPATIENT)
Dept: NEUROSURGERY | Facility: CLINIC | Age: 59
End: 2019-03-26
Payer: MEDICAID

## 2019-03-26 ENCOUNTER — EMERGENCY (EMERGENCY)
Facility: HOSPITAL | Age: 59
LOS: 0 days | Discharge: HOME | End: 2019-03-26
Attending: EMERGENCY MEDICINE | Admitting: EMERGENCY MEDICINE

## 2019-03-26 VITALS
SYSTOLIC BLOOD PRESSURE: 126 MMHG | TEMPERATURE: 98 F | DIASTOLIC BLOOD PRESSURE: 73 MMHG | OXYGEN SATURATION: 97 % | RESPIRATION RATE: 16 BRPM | HEART RATE: 81 BPM

## 2019-03-26 VITALS — WEIGHT: 175 LBS | BODY MASS INDEX: 24.5 KG/M2 | HEIGHT: 71 IN

## 2019-03-26 DIAGNOSIS — F43.21 ADJUSTMENT DISORDER WITH DEPRESSED MOOD: ICD-10-CM

## 2019-03-26 DIAGNOSIS — M54.2 CERVICALGIA: ICD-10-CM

## 2019-03-26 DIAGNOSIS — F43.20 ADJUSTMENT DISORDER, UNSPECIFIED: ICD-10-CM

## 2019-03-26 DIAGNOSIS — Z79.82 LONG TERM (CURRENT) USE OF ASPIRIN: ICD-10-CM

## 2019-03-26 DIAGNOSIS — M47.812 SPONDYLOSIS W/OUT MYELOPATHY OR RADICULOPATHY, CERVICAL REGION: ICD-10-CM

## 2019-03-26 DIAGNOSIS — Z79.899 OTHER LONG TERM (CURRENT) DRUG THERAPY: ICD-10-CM

## 2019-03-26 LAB
ALBUMIN SERPL ELPH-MCNC: 4.7 G/DL — SIGNIFICANT CHANGE UP (ref 3.5–5.2)
ALP SERPL-CCNC: 118 U/L — HIGH (ref 30–115)
ALT FLD-CCNC: 89 U/L — HIGH (ref 0–41)
ANION GAP SERPL CALC-SCNC: 13 MMOL/L — SIGNIFICANT CHANGE UP (ref 7–14)
APAP SERPL-MCNC: <5 UG/ML — LOW (ref 10–30)
AST SERPL-CCNC: 41 U/L — SIGNIFICANT CHANGE UP (ref 0–41)
BASOPHILS # BLD AUTO: 0.06 K/UL — SIGNIFICANT CHANGE UP (ref 0–0.2)
BASOPHILS NFR BLD AUTO: 0.7 % — SIGNIFICANT CHANGE UP (ref 0–1)
BILIRUB SERPL-MCNC: 0.7 MG/DL — SIGNIFICANT CHANGE UP (ref 0.2–1.2)
BUN SERPL-MCNC: 14 MG/DL — SIGNIFICANT CHANGE UP (ref 10–20)
CALCIUM SERPL-MCNC: 9.7 MG/DL — SIGNIFICANT CHANGE UP (ref 8.5–10.1)
CHLORIDE SERPL-SCNC: 105 MMOL/L — SIGNIFICANT CHANGE UP (ref 98–110)
CO2 SERPL-SCNC: 23 MMOL/L — SIGNIFICANT CHANGE UP (ref 17–32)
CREAT SERPL-MCNC: 0.7 MG/DL — SIGNIFICANT CHANGE UP (ref 0.7–1.5)
EOSINOPHIL # BLD AUTO: 0.08 K/UL — SIGNIFICANT CHANGE UP (ref 0–0.7)
EOSINOPHIL NFR BLD AUTO: 0.9 % — SIGNIFICANT CHANGE UP (ref 0–8)
ETHANOL SERPL-MCNC: <10 MG/DL — HIGH
GLUCOSE SERPL-MCNC: 91 MG/DL — SIGNIFICANT CHANGE UP (ref 70–99)
HCT VFR BLD CALC: 46.2 % — SIGNIFICANT CHANGE UP (ref 42–52)
HGB BLD-MCNC: 13.7 G/DL — LOW (ref 14–18)
IMM GRANULOCYTES NFR BLD AUTO: 0.3 % — SIGNIFICANT CHANGE UP (ref 0.1–0.3)
LYMPHOCYTES # BLD AUTO: 2.45 K/UL — SIGNIFICANT CHANGE UP (ref 1.2–3.4)
LYMPHOCYTES # BLD AUTO: 28.5 % — SIGNIFICANT CHANGE UP (ref 20.5–51.1)
MCHC RBC-ENTMCNC: 18.8 PG — LOW (ref 27–31)
MCHC RBC-ENTMCNC: 29.7 G/DL — LOW (ref 32–37)
MCV RBC AUTO: 63.5 FL — LOW (ref 80–94)
MONOCYTES # BLD AUTO: 0.56 K/UL — SIGNIFICANT CHANGE UP (ref 0.1–0.6)
MONOCYTES NFR BLD AUTO: 6.5 % — SIGNIFICANT CHANGE UP (ref 1.7–9.3)
NEUTROPHILS # BLD AUTO: 5.43 K/UL — SIGNIFICANT CHANGE UP (ref 1.4–6.5)
NEUTROPHILS NFR BLD AUTO: 63.1 % — SIGNIFICANT CHANGE UP (ref 42.2–75.2)
NRBC # BLD: 0 /100 WBCS — SIGNIFICANT CHANGE UP (ref 0–0)
PLATELET # BLD AUTO: 157 K/UL — SIGNIFICANT CHANGE UP (ref 130–400)
POTASSIUM SERPL-MCNC: 4.7 MMOL/L — SIGNIFICANT CHANGE UP (ref 3.5–5)
POTASSIUM SERPL-SCNC: 4.7 MMOL/L — SIGNIFICANT CHANGE UP (ref 3.5–5)
PROT SERPL-MCNC: 7.1 G/DL — SIGNIFICANT CHANGE UP (ref 6–8)
RBC # BLD: 7.28 M/UL — HIGH (ref 4.7–6.1)
RBC # FLD: 18.8 % — HIGH (ref 11.5–14.5)
SALICYLATES SERPL-MCNC: <0.3 MG/DL — LOW (ref 4–30)
SODIUM SERPL-SCNC: 141 MMOL/L — SIGNIFICANT CHANGE UP (ref 135–146)
WBC # BLD: 8.61 K/UL — SIGNIFICANT CHANGE UP (ref 4.8–10.8)
WBC # FLD AUTO: 8.61 K/UL — SIGNIFICANT CHANGE UP (ref 4.8–10.8)

## 2019-03-26 PROCEDURE — 99213 OFFICE O/P EST LOW 20 MIN: CPT

## 2019-03-26 RX ORDER — HYDROXYZINE HCL 10 MG
50 TABLET ORAL ONCE
Qty: 0 | Refills: 0 | Status: COMPLETED | OUTPATIENT
Start: 2019-03-26 | End: 2019-03-26

## 2019-03-26 RX ORDER — KETOROLAC TROMETHAMINE 30 MG/ML
15 SYRINGE (ML) INJECTION ONCE
Qty: 0 | Refills: 0 | Status: DISCONTINUED | OUTPATIENT
Start: 2019-03-26 | End: 2019-03-26

## 2019-03-26 RX ORDER — DEXAMETHASONE 0.5 MG/5ML
10 ELIXIR ORAL ONCE
Qty: 0 | Refills: 0 | Status: COMPLETED | OUTPATIENT
Start: 2019-03-26 | End: 2019-03-26

## 2019-03-26 RX ORDER — HYDROXYZINE HCL 10 MG
1 TABLET ORAL
Qty: 30 | Refills: 0 | OUTPATIENT
Start: 2019-03-26 | End: 2019-04-04

## 2019-03-26 RX ORDER — METHOCARBAMOL 500 MG/1
1 TABLET, FILM COATED ORAL
Qty: 21 | Refills: 0 | OUTPATIENT
Start: 2019-03-26 | End: 2019-04-01

## 2019-03-26 RX ADMIN — Medication 15 MILLIGRAM(S): at 20:11

## 2019-03-26 RX ADMIN — Medication 102 MILLIGRAM(S): at 16:07

## 2019-03-26 RX ADMIN — Medication 50 MILLIGRAM(S): at 18:42

## 2019-03-26 RX ADMIN — Medication 15 MILLIGRAM(S): at 16:07

## 2019-03-26 RX ADMIN — Medication 15 MILLIGRAM(S): at 18:13

## 2019-03-26 NOTE — ED ADULT NURSE NOTE - OBJECTIVE STATEMENT
patient c/o of depression due to neck pain. patient denies any hallucinations or thoughts of hurting others or himself.

## 2019-03-26 NOTE — ED BEHAVIORAL HEALTH ASSESSMENT NOTE - OTHER PAST PSYCHIATRIC HISTORY (INCLUDE DETAILS REGARDING ONSET, COURSE OF ILLNESS, INPATIENT/OUTPATIENT TREATMENT)
hx of bipolar disorder, no known prior suicide attempts, 1 prior IPP admission > 10 years ago, not currently on medications or outpatient treatment

## 2019-03-26 NOTE — ED PROVIDER NOTE - CLINICAL SUMMARY MEDICAL DECISION MAKING FREE TEXT BOX
pt cleared by psych. they will make outpt f/u appt for pt. request visteral to be sent to rx for breakthrough anxiety

## 2019-03-26 NOTE — ED PROVIDER NOTE - NS ED ROS FT
Review of Systems    Constitutional: (-) fever  Eyes/ENT: (-) blurry vision, (-) epistaxis  Cardiovascular: (-) chest pain, (-) syncope  Respiratory: (-) cough, (-) shortness of breath  Gastrointestinal: (-) vomiting, (-) diarrhea  Musculoskeletal: (+) neck pain, (-) back pain, (-) joint pain  Integumentary: (-) rash, (-) edema  Neurological: (-) headache, (-) altered mental status  Psychiatric: (-) hallucinations  Allergic/Immunologic: (-) pruritus  All other systems are negative except as mentioned above

## 2019-03-26 NOTE — ED ADULT NURSE NOTE - CHIEF COMPLAINT QUOTE
"I need to see a psychiatrist, im bipolar and I'm having anxiety attacks" Pt admits to being depressed. When asked if suicidal "im not answering that question because I don't want to get tied down" When asked if homicidal pt states "I love the world" 1:1 constant observation initiated. Pt also c/o pain to right side of neck

## 2019-03-26 NOTE — ED BEHAVIORAL HEALTH ASSESSMENT NOTE - RISK ASSESSMENT
Although patient is depressed and carries a diagnosis of bipolar d/o, he is not suicidal, homicidal, is future oriented (speaks about needing future surgeries for his neck, just closed on a house in [place]), has access and willingness to receive care, and has no past history of suicide attempts. He is not an imminent danger to himself or others and does not require inpatient psychiatic hospitalization at this time.

## 2019-03-26 NOTE — DATA REVIEWED
[de-identified] : \par -  CT scan of the cervical spine from 1/15/19 showed cervicals spondylosis most impressive at C5/6 and C6/7. [de-identified] : - MRI of the cervical spine which showed cervical spondylosis worse at C5/6 and C6/7 with right greater then left foraminal stenosis. [de-identified] : \par - Dynamic xrays of the cervical spine were stable without instability.

## 2019-03-26 NOTE — PHYSICAL EXAM
[FreeTextEntry1] : Constitutional: alert and in no acute distress.  Sensory exam:. Romberg's sign was negative.  Coordination:. normal gait.  Spine: Tenderness: right paraspinal and right trapezius muscle. Flexion was restricted. Extension was restricted. Left lateral flexion was restricted. Right lateral flexion was restricted. Special Tests: He has decreased ROM of the left shoulder status post recent shoulder surgery. There is no myelopathy. Motor exam is 5/5.

## 2019-03-26 NOTE — ED PROVIDER NOTE - PHYSICAL EXAMINATION
CON: Depressed appearing, ao x 3, HENMT: clear oropharynx,  neck supple able to move it, complains of pain with movement,  CV: rrr, equal pulses b/l, RESP: cta b/l, GI:  soft, nontender, no rebound, no guarding, SKIN: no rash, MSK: no deformities, NEURO: no gross motor or sensory deficit Psychiatric: appropriate mood, appropriate affect

## 2019-03-26 NOTE — ED ADULT NURSE NOTE - NSIMPLEMENTINTERV_GEN_ALL_ED
Implemented All Universal Safety Interventions:  [place] to call system. Call bell, personal items and telephone within reach. Instruct patient to call for assistance. Room bathroom lighting operational. Non-slip footwear when patient is off stretcher. Physically safe environment: no spills, clutter or unnecessary equipment. Stretcher in lowest position, wheels locked, appropriate side rails in place.

## 2019-03-26 NOTE — ED BEHAVIORAL HEALTH ASSESSMENT NOTE - HPI (INCLUDE ILLNESS QUALITY, SEVERITY, DURATION, TIMING, CONTEXT, MODIFYING FACTORS, ASSOCIATED SIGNS AND SYMPTOMS)
This is a 60yo , single, male, domiciled with his girlfriend in private residence, on disability for hx of bipolar disorder, no known prior suicide attempts, 1 prior IPP admission > 10 years ago, not currently on medications or outpatient treatment, who presented to the ED after speaking with his old therapist/NP Psychiatrist Layne Lopez, who recommended he come to the ED for a mental health evaluation, for which psychiatry was consulted.    Upon approach, patient was calm and cooperative. He states "I'm feeling down. I used to Layne Lopez across the street in 2012 and called her up today and was told to come to the ED to get some medications to help me feel better". He states "I'm going through a tough time right now. Pamela is just not listening to me and it's not good". He states that his partner, Pamela, gave him a deadline and that they are splitting up and "I don't feel good about it". He states "I need something to get my endorphins up a medication or something". He states he hasn't been sleeping well, hasn't been eating regular meals, has low energy levels, and feels "depressed". When asked if he is having thoughts of wanting to hurt himself, he states "i'm not answering that. I'm not trying to get committed". He denies ever trying to end his life. He denies any thoughts of wanting to hurt others, stating "No I would never do that". When asked if he made a joke to the [facility] about having a bomb, he states "wow that kid cannot take a joke. I would never try to do something like that". He admits to having high anxiety levels for the past few days stating that he is having panic attacks 2-3 times a day where he feels like he can't breathe and his chest hurts. He states that he is very stressed out with the conflict with his girlfriend. He reports that she gave him a deadline of next week and he states he has to be somewhere on April 6th and has a lot of things to take care of.  He denies any symptoms of psychosis or hitesh. He denies any prior suicide attempts. He denies any current substance use.     spoke to pt's girlfriend- Pamela Zambrano- [phone]- she states that patient is not handling their breakup very well. She states she made her final decision to split 2 weeks ago and since then, he has been increasingly depressed. She states that she has known him for 7 years and in those 7 years he has never tried to kill himself or hurt others. She states that he is afraid of dying and that he becomes constantly anxious and overworked over small things and makes comments such as "Oh my God I'm going to die". She states he is afraid of living alone and is hoping she will change her mind. She states "I'm sorry to say this but I think most of this is attention seeking and he's trying to get my sympathy". She reports that she does not think he would try to end his life stating "but I hope it's not like the boy who cried mercado". She states that she encouraged him to see his old therapist, Layne, but states he had trouble trying to make an appointment with her because she no longer works across the street. She states he needs help in getting an appointment and being put on medications again. She denies any substance use but states that patient will sometimes use street xanax to calm down and drinks alcohol occasionally. When asked if patient has access to firearms or bombs, she states "No not that I know of. He has hunting rifles in [place]". She reports that patient is welcome to stay with her until he moves to [place] and that she will not have him be out on the streets. This is a 60yo , single, male, domiciled with his girlfriend in private residence, on disability for hx of bipolar disorder, no known prior suicide attempts, 1 prior IPP admission > 10 years ago, not currently on medications or outpatient treatment, who presented to the ED after speaking with his old therapist/NP Psychiatrist Layne Lopez, who recommended he come to the ED for a mental health evaluation, for which psychiatry was consulted.    Upon approach, patient was calm and cooperative. He states "I'm feeling down. I used to Layne Lopez across the street in 2012 and called her up today and was told to come to the ED to get some medications to help me feel better". He states "I'm going through a tough time right now. Pamela is just not listening to me and it's not good". He states that his partner, Pamela, gave him a deadline and that they are splitting up and "I don't feel good about it". He states "I need something to get my endorphins up a medication or something". He states he hasn't been sleeping well, hasn't been eating regular meals, has low energy levels, and feels "depressed". When asked if he is having thoughts of wanting to hurt himself, he states "i'm not answering that. I'm not trying to get committed". He denies ever trying to end his life. He denies any thoughts of wanting to hurt others, stating "No I would never do that". When asked if he made a joke to the [facility] about having a bomb, he states "wow that kid cannot take a joke. I would never try to do something like that". He admits to having high anxiety levels for the past few days stating that he is having panic attacks 2-3 times a day where he feels like he can't breathe and his chest hurts. He states that he is very stressed out with the conflict with his girlfriend. He reports that she gave him a deadline of next week and he states he has to be somewhere on April 6th and has a lot of things to take care of.  He denies any symptoms of psychosis or hitesh. He denies any prior suicide attempts. He denies any current substance use.     When writer went to reassess patient after he received atarax, he states "I'm feeling better because I get to see you. I'm not depressed anymore. I'm just happy got to meet you". He reports that he is afraid of change and after trying to work out things with his girlfriend, buying her houses and material things, he states "she's not seeing eye to eye with me. She's throwing me out like a dog. It's not fair". He reports that he would not try to hurt himself, stating "I'm just feeling depressed and that's why I came to the ED in the first place because I wanted to talk to someone."     spoke to pt's girlfriend- Pamela Zambrano- [phone]- she states that patient is not handling their breakup very well. She states she made her final decision to split 2 weeks ago and since then, he has been increasingly depressed. She states that she has known him for 7 years and in those 7 years he has never tried to kill himself or hurt others. She states that he is afraid of dying and that he becomes constantly anxious and overworked over small things and makes comments such as "Oh my God I'm going to die". She states he is afraid of living alone and is hoping she will change her mind. She states "I'm sorry to say this but I think most of this is attention seeking and he's trying to get my sympathy". She reports that she does not think he would try to end his life stating "but I hope it's not like the boy who cried mercado". She states that she encouraged him to see his old therapist, Layne, but states he had trouble trying to make an appointment with her because she no longer works across the street. She states he needs help in getting an appointment and being put on medications again. She denies any substance use but states that patient will sometimes use street xanax to calm down and drinks alcohol occasionally. When asked if patient has access to firearms or bombs, she states "No not that I know of. He has hunting rifles in [place]". She reports that patient is welcome to stay with her until he moves to [place] and that she will not have him be out on the streets.    attempted to reach NP Layne Lopez- unable to reach her at this time

## 2019-03-26 NOTE — ED BEHAVIORAL HEALTH ASSESSMENT NOTE - SAFETY PLAN DETAILS
Pt instructed that should he have thoughts of wanting to hurt himself or others, he should seek medical attention immediately.

## 2019-03-26 NOTE — REASON FOR VISIT
[Follow-Up: _____] : a [unfilled] follow-up visit [FreeTextEntry1] : Since his last visit he has had 1 EJ with pain management without improvement. His pain specialist has now recommended percutaneous discectomy since Mr. Strauss is hesitant in regards to surgical intervention.

## 2019-03-26 NOTE — ASSESSMENT
[FreeTextEntry1] : We have had a long and thorough discussion regarding his current condition findings and treatment options. I believe his symptoms are related to the underlying spondylosis at C5/6 and C6/7. He continues to defer PT treatments. His symptoms have not improved after a EJ x 1. He is a candidate for a C5/6 C6/7 anterior cervical discectomy and fusion. He remains hesitant. He would like to trial percutaneous discectomy as recommended by his pain specialist. He will return to the office his pain continues despite continued conservative treatments.

## 2019-03-26 NOTE — ED PROVIDER NOTE - OBJECTIVE STATEMENT
60 y/o M with PMHx of depression presents for neck for the last 2 months. Pt went to Dr. James for the neck pain who advised surgery but pt refused. Pt was told by his NP to come to ED for the pain and his depression. He admits to hurting himself but refuses to further explain. Pt denies thoughts of hurting others. 60 y/o M with PMHx of depression presents for neck for the last 2 months. Pt went to Dr. James for the neck pain who advised surgery but pt refused. Pt afterwards called his previous psych NP was told by his NP to come to ED for mental health eval. Pt admits that he wants to hurthimself  but refuses to further explain. Pt denies thoughts of hurting others. pt currently having living situation as he broken up from girlfriend and is being evicted from appt.

## 2019-03-26 NOTE — ED BEHAVIORAL HEALTH ASSESSMENT NOTE - SUMMARY
This is a 60yo , single, male, domiciled with his girlfriend in private residence, on disability for hx of bipolar disorder, no known prior suicide attempts, 1 prior IPP admission > 10 years ago, not currently on medications or outpatient treatment, who presented to the ED after speaking with his old therapist/NP Psychiatrist Layne Lopez, who recommended he come to the ED for a mental health evaluation, for which psychiatry was consulted. This is a 60yo , single, male, domiciled with his girlfriend in private residence, on disability for hx of bipolar disorder, no known prior suicide attempts, 1 prior IPP admission > 10 years ago, not currently on medications or outpatient treatment, who presented to the ED after speaking with his old therapist/NP Psychiatrist Layne Lopez, who recommended he come to the ED for a mental health evaluation, for which psychiatry was consulted.    Upon assessment, patient is not suicidal, homicidal, psychotic, or manic. Patient is depressed in context of his recent break up with his girlfriend and his having to move out of her house by next week to live in [place]. He is not an imminent danger to himself or others and does not require inpatient psychiatric hospitalization at this time. Patient would benefit from outpatient psychiatric treatment for psychotherapy and medication management to help him cope with his stressors and increase his frustration tolerance. He is amenable to this plan. Writer will call him tomorrow with appointment date and time.

## 2019-03-26 NOTE — ED ADULT TRIAGE NOTE - CHIEF COMPLAINT QUOTE
"I need to see a psychiatrist, im bipolar and i'm having anxiety attacks" Pt admits to being depressed. When asked if suicidal "im not answering that question because I don't want to get tied down" "I need to see a psychiatrist, im bipolar and I'm having anxiety attacks" Pt admits to being depressed. When asked if suicidal "im not answering that question because I don't want to get tied down" When asked if homicidal pt states "I love the world" 1:1 constant observation initiated. Pt also c/o pain to right side of neck

## 2019-03-27 RX ORDER — HYDROXYZINE HCL 10 MG
1 TABLET ORAL
Qty: 30 | Refills: 0 | OUTPATIENT
Start: 2019-03-27 | End: 2019-04-05

## 2019-03-27 NOTE — ED POST DISCHARGE NOTE - REASON FOR FOLLOW-UP
Other RX ISSUE: ATARAX WAS PRESCRIBED BY PSYCHIATRIST, BUT NEVER SENT TO PHARMACY. I SENT MEDS TODAY FOR PATIENT.

## 2019-06-04 ENCOUNTER — APPOINTMENT (OUTPATIENT)
Dept: NEUROSURGERY | Facility: CLINIC | Age: 59
End: 2019-06-04

## 2019-07-01 PROBLEM — I71.2 ANEURYSM OF THORACIC AORTA: Status: ACTIVE | Noted: 2017-10-20

## 2019-10-04 NOTE — ED ADULT NURSE NOTE - CAS EDN DISCHARGE INTERVENTIONS
DATE OF PROCEDURE:    SURGEON:  Yves Hannah M.D.    PREOPERATIVE DIAGNOSES:  Abnormal imaging of the colon and diarrhea.    POSTOPERATIVE DIAGNOSIS:  Pan-ulcerative colitis.    PROCEDURE PERFORMED:  Colonoscopy with biopsy.    MEDICATIONS:  Per the procedure sedation record.    ESTIMATED BLOOD LOSS:  None.    SPECIMENS REMOVED:  Colon aspirate for stool culture and colon biopsies.    CONSENT:  Consent was obtained from the patient after explaining risks,  benefits, and alternatives of the procedure.    DESCRIPTION OF PROCEDURE:  After obtaining appropriate level of sedation and  placing the patient in the left lateral decubitus position, an Olympus  colonoscope was visually guided into rectum and advanced without difficulty to  the cecum.  From the cecum all the way to the rectum, there was diffuse  circumferential ulcerative colitis seen.  Biopsies were obtained from the colon,  randomly from the right and left colon.  Stool aspirates were also submitted for  stool culture.  The colonoscope was then carefully withdrawn.  No other  abnormalities were seen.  The patient tolerated the procedure well without  complications.    PLAN:  We will start him on intravenous Solu-Medrol 30 mg q.12 with a slow taper  as well as mesalamine orally and then we will discharge home eventually with  oral prednisone with a slow taper and mesalamine 1.2 g tablet 2 tablets daily.    He can start a low-residue diet as well.        DATE AND TIME                       Yves Hannah M.D.      RS/MEDQ-#[id number]  DD:  06/17/2017 10:29:39      DT:  06/17/2017 16:03:35    cc:  Jayde Castillo M.D.        [facility]    REPORT OF OPERATION         IKRAN ZHAO  MRN#: [id number]  ROOM: [address] [place] [address]  ACCT#: [id number] Reports         Electronically Signed On 06/19/2017 17:12  __________________________________________________   YVES GROSS    
IV discontinued, cath removed intact

## 2021-01-03 NOTE — ED BEHAVIORAL HEALTH ASSESSMENT NOTE - FAMILY HISTORY OF SUBSTANCE ABUSE
Received a request from [employer] regarding drug change request as Omnicef is not covered by the insurance, switch over to Augmentin.  Prescription sent to pharmacy   None known

## 2021-05-27 NOTE — ED ADULT NURSE NOTE - NSFALLRSKINDICATORS_ED_ALL_ED
[facility]    Brief Operative Note    Pre-operative diagnosis: Osteoarthritis of right knee [M17.11]  Post-operative diagnosis Same as pre-operative diagnosis    Procedure: Procedure(s):  RIGHT KNEE TOTAL REPLACEMENT  Surgeon: Surgeon(s) and Role:     * Carlos Rene MD - Primary     * Lance Rodriguez PA-C - Assisting  Anesthesia: Spinal   Estimated blood loss: Less than 50 ml  Drains: None  Specimens:   ID Type Source Tests Collected by Time Destination   A : RIGHT KNEE BONE AND TISSUE Tissue Knee, Right SURGICAL PATHOLOGY EXAM Carlos Rene MD 5/27/2021  9:33 AM      Findings:   None.  Complications: None.  Implants:   Implant Name Type Inv. Item Serial No.  Lot No. LRB No. Used Action   SCREW-FEMALE HEX 2.5MM/25MM LENGTH - [id number]  Screw-Female Hex 2.5mm/25mm Length  ROBYN [id number] Right 1 Implanted   BONE CEMENT-SIMPLEX - [id number]  Bone Cement-Simplex  XOCHITL [id number] Right 2 Implanted   VIVACIT-E HIGHLY CROSSLINKED POLYETHYLENE ARTICULAR SURFACE FIXED BEARING POSTERIOR STABILIZED     [id number] Right 1 Implanted   PATELLA-PERSONA 35MM ALL-POLY CEMENTED - [id number]  Patella-Persona 35mm All-Poly Cemented  ROBYN [id number] Right 1 Implanted   FEMORAL COMPONENT-PERSONA RIGHT SIZE 9 NARROW - [id number]  Femoral Component-Persona Right Size 9 Narrow  ROBYN [id number] Right 1 Implanted   TIBIAL STEM-PERSONA RIGHT SIZE F 5 DEGREE - [id number]  Tibial Stem-Persona Right Size F 5 Degree  ROBYN [id number] Right 1 Implanted            no

## 2023-09-12 NOTE — ED ADULT NURSE NOTE - CHPI ED NUR DURATION
MEDICATIONS  (STANDING):  influenza   Vaccine 0.5 milliLiter(s) IntraMuscular once  sodium chloride 0.9%. 1000 milliLiter(s) (100 mL/Hr) IV Continuous <Continuous>    MEDICATIONS  (PRN):  ondansetron Injectable 4 milliGRAM(s) IV Push every 4 hours PRN Nausea and/or Vomiting  ondansetron Injectable 4 milliGRAM(s) IV Push once PRN Nausea and/or Vomiting  prochlorperazine   Injectable 5 milliGRAM(s) IV Push every 8 hours PRN nausea  
2 months/month(s)

## 2023-10-05 NOTE — ED BEHAVIORAL HEALTH ASSESSMENT NOTE - DESCRIPTION
..I have reviewed discharge instructions with the patient. The patient verbalized understanding. Discharge medications reviewed with patient and appropriate educational materials and side effects teaching were provided. Follow-up appointments reviewed. Opportunity for questions and clarification was provided. Venous access removed without difficulty. Patient's belongings gathered and sent with patient. Patient is ready for discharge.      Xin Mccormack RN pt sitting on ED chair in NAD w/ 1:1 at chairside. HTN, CAD, and Dyslipidemia , single, male, domiciled with his girlfriend in private residence, on disability, previously worked in [employer]

## 2024-06-10 NOTE — ED ADULT NURSE NOTE - NS ED NURSE DC INFO COMPLEXITY
Health Maintenance       Pneumococcal Vaccine 0-64 (1 of 2 - PCV)  Never done    Depression Screening (Yearly)  Due soon on 11/8/2024           Following review of the above:  Patient wishes to discuss with clinician: depression screening    Note: Refer to final orders and clinician documentation.        Recent PHQ 2/9 Score    PHQ 2:  PHQ 2 Score Adult PHQ 2 Score Adult PHQ 2 Interpretation Little interest or pleasure in activity?   6/10/2024  10:33 AM 0 No further screening needed 0       PHQ 9:  PHQ 9 Score Adult PHQ 9 Score Adult PHQ 9 Interpretation   6/10/2024  10:33 AM 1 Minimal Depression        Most Recent SHELBY 7 Score         Verbalized Understanding/Simple: Patient demonstrates quick and easy understanding